# Patient Record
Sex: MALE | Race: AMERICAN INDIAN OR ALASKA NATIVE
[De-identification: names, ages, dates, MRNs, and addresses within clinical notes are randomized per-mention and may not be internally consistent; named-entity substitution may affect disease eponyms.]

---

## 2018-12-29 ENCOUNTER — HOSPITAL ENCOUNTER (INPATIENT)
Dept: HOSPITAL 5 - ED | Age: 58
LOS: 2 days | Discharge: HOME | DRG: 291 | End: 2018-12-31
Attending: INTERNAL MEDICINE | Admitting: INTERNAL MEDICINE
Payer: MEDICARE

## 2018-12-29 DIAGNOSIS — N18.3: ICD-10-CM

## 2018-12-29 DIAGNOSIS — Z87.891: ICD-10-CM

## 2018-12-29 DIAGNOSIS — I50.43: ICD-10-CM

## 2018-12-29 DIAGNOSIS — I13.0: Primary | ICD-10-CM

## 2018-12-29 LAB
ALBUMIN SERPL-MCNC: 3.8 G/DL (ref 3.9–5)
ALT SERPL-CCNC: 36 UNITS/L (ref 7–56)
BACTERIA #/AREA URNS HPF: (no result) /HPF
BASOPHILS # (AUTO): 0.1 K/MM3 (ref 0–0.1)
BASOPHILS NFR BLD AUTO: 1.2 % (ref 0–1.8)
BILIRUB UR QL STRIP: (no result)
BLOOD UR QL VISUAL: (no result)
BUN SERPL-MCNC: 23 MG/DL (ref 9–20)
BUN/CREAT SERPL: 10 %
CALCIUM SERPL-MCNC: 9 MG/DL (ref 8.4–10.2)
EOSINOPHIL # BLD AUTO: 0.1 K/MM3 (ref 0–0.4)
EOSINOPHIL NFR BLD AUTO: 1.5 % (ref 0–4.3)
HCT VFR BLD CALC: 42.6 % (ref 35.5–45.6)
HDLC SERPL-MCNC: 45 MG/DL (ref 40–59)
HEMOLYSIS INDEX: 1
HGB BLD-MCNC: 13.8 GM/DL (ref 11.8–15.2)
LYMPHOCYTES # BLD AUTO: 1.9 K/MM3 (ref 1.2–5.4)
LYMPHOCYTES NFR BLD AUTO: 22 % (ref 13.4–35)
MCH RBC QN AUTO: 26 PG (ref 28–32)
MCHC RBC AUTO-ENTMCNC: 32 % (ref 32–34)
MCV RBC AUTO: 82 FL (ref 84–94)
MONOCYTES # (AUTO): 0.6 K/MM3 (ref 0–0.8)
MONOCYTES % (AUTO): 6.6 % (ref 0–7.3)
PH UR STRIP: 7 [PH] (ref 5–7)
PLATELET # BLD: 295 K/MM3 (ref 140–440)
RBC # BLD AUTO: 5.21 M/MM3 (ref 3.65–5.03)
RBC #/AREA URNS HPF: < 1 /HPF (ref 0–6)
UROBILINOGEN UR-MCNC: < 2 MG/DL (ref ?–2)
WBC #/AREA URNS HPF: 1 /HPF (ref 0–6)

## 2018-12-29 PROCEDURE — 36415 COLL VENOUS BLD VENIPUNCTURE: CPT

## 2018-12-29 PROCEDURE — 80053 COMPREHEN METABOLIC PANEL: CPT

## 2018-12-29 PROCEDURE — 84484 ASSAY OF TROPONIN QUANT: CPT

## 2018-12-29 PROCEDURE — 83880 ASSAY OF NATRIURETIC PEPTIDE: CPT

## 2018-12-29 PROCEDURE — 93010 ELECTROCARDIOGRAM REPORT: CPT

## 2018-12-29 PROCEDURE — 85025 COMPLETE CBC W/AUTO DIFF WBC: CPT

## 2018-12-29 PROCEDURE — 96374 THER/PROPH/DIAG INJ IV PUSH: CPT

## 2018-12-29 PROCEDURE — 71045 X-RAY EXAM CHEST 1 VIEW: CPT

## 2018-12-29 PROCEDURE — 93306 TTE W/DOPPLER COMPLETE: CPT

## 2018-12-29 PROCEDURE — 83036 HEMOGLOBIN GLYCOSYLATED A1C: CPT

## 2018-12-29 PROCEDURE — 81001 URINALYSIS AUTO W/SCOPE: CPT

## 2018-12-29 PROCEDURE — 93017 CV STRESS TEST TRACING ONLY: CPT

## 2018-12-29 PROCEDURE — 93005 ELECTROCARDIOGRAM TRACING: CPT

## 2018-12-29 PROCEDURE — 78452 HT MUSCLE IMAGE SPECT MULT: CPT

## 2018-12-29 PROCEDURE — A9502 TC99M TETROFOSMIN: HCPCS

## 2018-12-29 PROCEDURE — 80061 LIPID PANEL: CPT

## 2018-12-29 PROCEDURE — 99406 BEHAV CHNG SMOKING 3-10 MIN: CPT

## 2018-12-29 RX ADMIN — HYDROMORPHONE HYDROCHLORIDE PRN MG: 1 INJECTION, SOLUTION INTRAMUSCULAR; INTRAVENOUS; SUBCUTANEOUS at 22:34

## 2018-12-29 RX ADMIN — HYDRALAZINE HYDROCHLORIDE PRN MG: 20 INJECTION INTRAMUSCULAR; INTRAVENOUS at 16:51

## 2018-12-29 RX ADMIN — HYDRALAZINE HYDROCHLORIDE SCH MG: 25 TABLET, FILM COATED ORAL at 13:07

## 2018-12-29 RX ADMIN — POTASSIUM CHLORIDE SCH MEQ: 1500 TABLET, EXTENDED RELEASE ORAL at 13:07

## 2018-12-29 RX ADMIN — FUROSEMIDE SCH MG: 10 INJECTION, SOLUTION INTRAVENOUS at 17:43

## 2018-12-29 RX ADMIN — FAMOTIDINE SCH MG: 10 TABLET ORAL at 22:26

## 2018-12-29 RX ADMIN — Medication SCH ML: at 22:27

## 2018-12-29 RX ADMIN — ISOSORBIDE DINITRATE SCH MG: 20 TABLET ORAL at 10:20

## 2018-12-29 RX ADMIN — ENOXAPARIN SODIUM SCH MG: 100 INJECTION SUBCUTANEOUS at 17:43

## 2018-12-29 RX ADMIN — POTASSIUM CHLORIDE SCH MEQ: 1500 TABLET, EXTENDED RELEASE ORAL at 22:27

## 2018-12-29 RX ADMIN — FAMOTIDINE SCH MG: 10 TABLET ORAL at 13:07

## 2018-12-29 RX ADMIN — HYDRALAZINE HYDROCHLORIDE PRN MG: 20 INJECTION INTRAMUSCULAR; INTRAVENOUS at 22:34

## 2018-12-29 NOTE — HISTORY AND PHYSICAL REPORT
History of Present Illness


Date of examination: 12/29/18


Date of admission: 


12/29/2018


Chief complaint: 


Chief complaint


Increasing shortness of breath for 2 weeks





History of present illness: 


Apache:





58-year-old male with history of hypertension congestive heart failure comes in 

for increasing shortness of breath for the past 2 weeks.  Patient has sounds of 

breath on minimal exertion and has orthopnea.  No paroxysmal nocturnal dyspnea 

attacks.  No fever or chills.





Past Medical History


Previous Medical History?: Yes


Hx Hypertension: Yes


Hx Congestive Heart Failure: Yes


Additional medical history: BRONCHITIS





Surgical History


Past Surgical History?: No





Family History


Family history: no significant





 Social History


Smoking Status: Former Smoker (none x 6 months)


Substance Use Type: None (denies illicit drug use)





Medications


Home Medications: 


                                Home Medications











 Medication  Instructions  Recorded  Confirmed  Last Taken  Type


 


Hydralazine HCl 1 tab PO DAILY 12/29/18 12/29/18 12/28/18 History


 


Isosorbide Dinitrate 20 mg PO DAILY 12/29/18 12/29/18 12/28/18 History


 


Torsemide [Demadex] 40 mg PO DAILY 12/29/18 12/29/18 12/29/18 History











Review of Systems


ROS: 


Stated complaint: FLU


Other details as noted in HPI





Constitutional: no symptoms reported


Eyes: denies: eye pain


ENT: denies: throat pain


Respiratory: shortness of breath, SOB with exertion


Cardiovascular: chest pain, dyspnea on exertion


Endocrine: no symptoms reported


Gastrointestinal: other (abdomen feels tight)


Genitourinary: denies: dysuria


Musculoskeletal: denies: back pain


Neurological: denies: headache








Medications and Allergies


                                    Allergies











Allergy/AdvReac Type Severity Reaction Status Date / Time


 


No Known Allergies Allergy   Verified 12/29/18 09:24











                                Home Medications











 Medication  Instructions  Recorded  Confirmed  Last Taken  Type


 


Hydralazine HCl 1 tab PO DAILY 12/29/18 12/29/18 12/28/18 History


 


Isosorbide Dinitrate 20 mg PO DAILY 12/29/18 12/29/18 12/28/18 History


 


Torsemide [Demadex] 40 mg PO DAILY 12/29/18 12/29/18 12/29/18 History














Exam





- Constitutional


Vitals: 


                                        











Temp Pulse Resp BP Pulse Ox


 


 98.1 F   96 H  28 H  160/110   94 


 


 12/29/18 05:47  12/29/18 08:00  12/29/18 08:00  12/29/18 08:00  12/29/18 08:00











General appearance: Present: no acute distress, well-nourished





- EENT


Eyes: Present: PERRL


ENT: hearing intact, clear oral mucosa





- Neck


Neck: Present: supple, normal ROM





- Respiratory


Respiratory effort: normal


Respiratory: bilateral: CTA, rales





- Cardiovascular


Heart rate: 86


Rhythm: regular


Heart Sounds: Present: S1 & S2.  Absent: rub, click





- Extremities


Extremities: no ischemia, pulses intact, pulses symmetrical, No edema


Peripheral Pulses: within normal limits





- Abdominal


General gastrointestinal: Present: soft, non-tender, non-distended, normal bowel

 sounds


Male genitourinary: Present: normal





- Integumentary


Integumentary: Present: clear, warm, dry





- Musculoskeletal


Musculoskeletal: gait normal, strength equal bilaterally





- Psychiatric


Psychiatric: appropriate mood/affect, intact judgment & insight





- Neurologic


Neurologic: CNII-XII intact, moves all extremities





- Allied Health


Allied health notes reviewed: nursing, case management





Results





- Labs


CBC & Chem 7: 


                                 12/30/18 04:52





                                 12/30/18 04:52


Labs: 


                             Laboratory Last Values











WBC  8.5 K/mm3 (4.5-11.0)   12/29/18  04:08    


 


RBC  5.21 M/mm3 (3.65-5.03)  H  12/29/18  04:08    


 


Hgb  13.8 gm/dl (11.8-15.2)   12/29/18  04:08    


 


Hct  42.6 % (35.5-45.6)   12/29/18  04:08    


 


MCV  82 fl (84-94)  L  12/29/18  04:08    


 


MCH  26 pg (28-32)  L  12/29/18  04:08    


 


MCHC  32 % (32-34)   12/29/18  04:08    


 


RDW  15.8 % (13.2-15.2)  H  12/29/18  04:08    


 


Plt Count  295 K/mm3 (140-440)   12/29/18  04:08    


 


Lymph % (Auto)  22.0 % (13.4-35.0)   12/29/18  04:08    


 


Mono % (Auto)  6.6 % (0.0-7.3)   12/29/18  04:08    


 


Eos % (Auto)  1.5 % (0.0-4.3)   12/29/18  04:08    


 


Baso % (Auto)  1.2 % (0.0-1.8)   12/29/18  04:08    


 


Lymph #  1.9 K/mm3 (1.2-5.4)   12/29/18  04:08    


 


Mono #  0.6 K/mm3 (0.0-0.8)   12/29/18  04:08    


 


Eos #  0.1 K/mm3 (0.0-0.4)   12/29/18  04:08    


 


Baso #  0.1 K/mm3 (0.0-0.1)   12/29/18  04:08    


 


Seg Neutrophils %  68.7 % (40.0-70.0)   12/29/18  04:08    


 


Seg Neutrophils #  5.9 K/mm3 (1.8-7.7)   12/29/18  04:08    


 


Sodium  137 mmol/L (137-145)   12/29/18  04:08    


 


Potassium  3.7 mmol/L (3.6-5.0)   12/29/18  04:08    


 


Chloride  93.2 mmol/L ()  L  12/29/18  04:08    


 


Carbon Dioxide  31 mmol/L (22-30)  H  12/29/18  04:08    


 


Anion Gap  17 mmol/L  12/29/18  04:08    


 


BUN  23 mg/dL (9-20)  H  12/29/18  04:08    


 


Creatinine  2.4 mg/dL (0.8-1.5)  H  12/29/18  04:08    


 


Estimated GFR  28 ml/min  12/29/18  04:08    


 


BUN/Creatinine Ratio  10 %  12/29/18  04:08    


 


Glucose  158 mg/dL ()  H  12/29/18  04:08    


 


Calcium  9.0 mg/dL (8.4-10.2)   12/29/18  04:08    


 


Total Bilirubin  0.70 mg/dL (0.1-1.2)   12/29/18  04:08    


 


AST  29 units/L (5-40)   12/29/18  04:08    


 


ALT  36 units/L (7-56)   12/29/18  04:08    


 


Alkaline Phosphatase  106 units/L ()   12/29/18  04:08    


 


Troponin T  0.074 ng/mL (0.00-0.029)  H  12/29/18  04:08    


 


NT-Pro-B Natriuret Pep  4600 pg/mL (0-900)  H  12/29/18  04:08    


 


Total Protein  6.5 g/dL (6.3-8.2)   12/29/18  04:08    


 


Albumin  3.8 g/dL (3.9-5)  L  12/29/18  04:08    


 


Albumin/Globulin Ratio  1.4 %  12/29/18  04:08    


 


Triglycerides  99 mg/dL (2-149)   12/29/18  04:08    


 


Cholesterol  153 mg/dL ()   12/29/18  04:08    


 


LDL Cholesterol Direct  103 mg/dL ()   12/29/18  04:08    


 


HDL Cholesterol  45 mg/dL (40-59)   12/29/18  04:08    


 


Cholesterol/HDL Ratio  3.40 %  12/29/18  04:08    


 


Urine Color  Colorless  (Yellow)   12/29/18  04:30    


 


Urine Turbidity  Clear  (Clear)   12/29/18  04:30    


 


Urine pH  7.0  (5.0-7.0)   12/29/18  04:30    


 


Ur Specific Gravity  1.003  (1.003-1.030)   12/29/18  04:30    


 


Urine Protein  100 mg/dl mg/dL (Negative)   12/29/18  04:30    


 


Urine Glucose (UA)  Neg mg/dL (Negative)   12/29/18  04:30    


 


Urine Ketones  Neg mg/dL (Negative)   12/29/18  04:30    


 


Urine Blood  Sm  (Negative)   12/29/18  04:30    


 


Urine Nitrite  Neg  (Negative)   12/29/18  04:30    


 


Urine Bilirubin  Neg  (Negative)   12/29/18  04:30    


 


Urine Urobilinogen  < 2.0 mg/dL (<2.0)   12/29/18  04:30    


 


Ur Leukocyte Esterase  Neg  (Negative)   12/29/18  04:30    


 


Urine WBC (Auto)  1.0 /HPF (0.0-6.0)   12/29/18  04:30    


 


Urine RBC (Auto)  < 1.0 /HPF (0.0-6.0)   12/29/18  04:30    


 


U Epithel Cells (Auto)  < 1.0 /HPF (0-13.0)   12/29/18  04:30    


 


Urine Bacteria (Auto)  1+ /HPF (Negative)   12/29/18  04:30    














- Imaging and Cardiology


EKG: report reviewed (sinus tachycardia 102 per minute no acute ST-T wave 

changes)


Imaging and Cardiology: 





CXR


IMPRESSION: 


Mild vascular congestion with slight bilateral effusions. Moderate 

cardiomegaly.. 








Assessment and Plan


Advance Directives: Yes (full code)


VTE prophylaxis?: Chemical


Plan of care discussed with patient/family: Yes





- Patient Problems


(1) Acute CHF (congestive heart failure)


Current Visit: Yes   Status: Acute   


Qualifiers: 


   Heart failure type: combined systolic and diastolic   Qualified Code(s): 

I50.41 - Acute combined systolic (congestive) and diastolic (congestive) heart 

failure   


Plan to address problem: 


IV Lasix initiated 


echocardiogram ordered


Daily weights


Intake and output


Cardiology consult if necessary








(2) Hypertension


Current Visit: Yes   Status: Chronic   


Qualifiers: 


   Hypertension type: essential hypertension   Qualified Code(s): I10 - 

Essential (primary) hypertension   


Plan to address problem: 


Continue antihypertensives








(3) Chronic kidney disease


Current Visit: Yes   Status: Chronic   


Qualifiers: 


   Chronic kidney disease stage: stage 3 (moderate)   Qualified Code(s): N18.3 -

 Chronic kidney disease, stage 3 (moderate)   


Plan to address problem: 


Stable








(4) DVT prophylaxis


Current Visit: Yes   Status: Acute   


Plan to address problem: 


Lovenox subcutaneous and GI prophylaxis

## 2018-12-29 NOTE — EMERGENCY DEPARTMENT REPORT
HPI





- General


Chief Complaint: Dyspnea/Respdistress


Time Seen by Provider: 18 06:04





- [facility]


HPI: 





Room 17





The patient is a 58-year-old male presenting with chief complaint of shortness 

of breath.  The patient states he has had worsening shortness of breath and 

dyspnea on exertion for the past 2 weeks.  Patient states he's had 2 pillow 

orthopnea.  The patient states she has been compliant with his diuretic.  

Patient states he's had intermittent sharps left-sided chest pain for the past 3

weeks.  Yesterday the patient states she developed bilateral lower extremity 

edema.





Location: Lungs, see above


Duration: [See above]


Quality: Shortness of breath


Severity: Moderate


Modifying factors: [see above]


Context: [see above]


Mode of transportation: [not driving]





ED Past Medical Hx





- Past Medical History


Previous Medical History?: Yes


Hx Hypertension: Yes


Hx Congestive Heart Failure: Yes


Additional medical history: BRONCHITIS





- Surgical History


Past Surgical History?: No





- Family History


Family history: no significant





- Social History


Smoking Status: Former Smoker (none x 6 months)


Substance Use Type: None (denies illicit drug use)





- Medications


Home Medications: 


                                Home Medications











 Medication  Instructions  Recorded  Confirmed  Last Taken  Type


 


Hydralazine HCl 1 tab PO DAILY 18 History


 


Isosorbide Dinitrate 20 mg PO DAILY 18 History


 


Torsemide [Demadex] 40 mg PO DAILY 18 History














ED Review of Systems


ROS: 


Stated complaint: FLU


Other details as noted in HPI





Constitutional: no symptoms reported


Eyes: denies: eye pain


ENT: denies: throat pain


Respiratory: shortness of breath, SOB with exertion


Cardiovascular: chest pain, dyspnea on exertion


Endocrine: no symptoms reported


Gastrointestinal: other (abdomen feels tight)


Genitourinary: denies: dysuria


Musculoskeletal: denies: back pain


Neurological: denies: headache





Physical Exam





- Physical Exam


Vital Signs: 


                                   Vital Signs











  18





  03:20 03:25 03:30


 


Temperature 98.4 F  


 


Pulse Rate 112 H  107 H


 


Respiratory 26 H 26 H 33 H





Rate   


 


Blood Pressure 183/130  184/128


 


Blood Pressure 183/130  





[Left]   


 


O2 Sat by Pulse 96 95 95





Oximetry   














  18





  03:46 04:00 04:15


 


Temperature   


 


Pulse Rate 110 H 106 H 100 H


 


Respiratory 35 H 25 H 17





Rate   


 


Blood Pressure 195/130 195/130 154/110


 


Blood Pressure   





[Left]   


 


O2 Sat by Pulse 93 96 98





Oximetry   














  18





  04:30 04:45 05:00


 


Temperature   


 


Pulse Rate 98 H 98 H 96 H


 


Respiratory 31 H 24 24





Rate   


 


Blood Pressure 172/116 175/119 154/110


 


Blood Pressure   





[Left]   


 


O2 Sat by Pulse 96 95 95





Oximetry   














  18





  05:16 05:30 05:46


 


Temperature   


 


Pulse Rate 98 H 97 H 99 H


 


Respiratory 24 24 22





Rate   


 


Blood Pressure 174/131 174/131 161/118


 


Blood Pressure   





[Left]   


 


O2 Sat by Pulse 93 96 88





Oximetry   














  18





  05:47 06:00


 


Temperature 98.1 F 


 


Pulse Rate  96 H


 


Respiratory 22 17





Rate  


 


Blood Pressure  174/131


 


Blood Pressure  





[Left]  


 


O2 Sat by Pulse 94 95





Oximetry  











Physical Exam: 





GENERAL: The patient is well-developed well-nourished male lying on stretcher 

not appearing to be in acute distress. []


HEENT: Normocephalic.  Atraumatic.  Extraocular motions are intact.  Patient has

 moist mucous membranes.


NECK: Supple.  Trachea midline


CHEST/LUNGS: Crackles at the right base.  There is no respiratory distress 

noted.


HEART/CARDIOVASCULAR: Regular.  There is no tachycardia.  There is no gallop rub

 or murmur.


ABDOMEN: Abdomen is soft, nontender.  Patient has normal bowel sounds.  There is

 no abdominal distention.


SKIN: There is no rash.  There is 1+ bilateral lower extremity pitting edema.  

There is no diaphoresis.


NEURO: The patient is awake, alert, and oriented.  The patient is cooperative. 

The patient has normal speech


MUSCULOSKELETAL: T There is no evidence of acute injury.





ED Course


                                   Vital Signs











  18





  03:20 03:25 03:30


 


Temperature 98.4 F  


 


Pulse Rate 112 H  107 H


 


Respiratory 26 H 26 H 33 H





Rate   


 


Blood Pressure 183/130  184/128


 


Blood Pressure 183/130  





[Left]   


 


O2 Sat by Pulse 96 95 95





Oximetry   














  18





  03:46 04:00 04:15


 


Temperature   


 


Pulse Rate 110 H 106 H 100 H


 


Respiratory 35 H 25 H 17





Rate   


 


Blood Pressure 195/130 195/130 154/110


 


Blood Pressure   





[Left]   


 


O2 Sat by Pulse 93 96 98





Oximetry   














  18





  04:30 04:45 05:00


 


Temperature   


 


Pulse Rate 98 H 98 H 96 H


 


Respiratory 31 H 24 24





Rate   


 


Blood Pressure 172/116 175/119 154/110


 


Blood Pressure   





[Left]   


 


O2 Sat by Pulse 96 95 95





Oximetry   














  18





  05:16 05:30 05:46


 


Temperature   


 


Pulse Rate 98 H 97 H 99 H


 


Respiratory 24 24 22





Rate   


 


Blood Pressure 174/131 174/131 161/118


 


Blood Pressure   





[Left]   


 


O2 Sat by Pulse 93 96 88





Oximetry   














  18





  05:47 06:00


 


Temperature 98.1 F 


 


Pulse Rate  96 H


 


Respiratory 22 17





Rate  


 


Blood Pressure  174/131


 


Blood Pressure  





[Left]  


 


O2 Sat by Pulse 94 95





Oximetry  














ED Medical Decision Making





- Lab Data


Result diagrams: 


                                 18 04:08





                                 18 04:08





                                Laboratory Tests











  18





  04:08 04:08 04:08


 


WBC  8.5  


 


RBC  5.21 H  


 


Hgb  13.8  


 


Hct  42.6  


 


MCV  82 L  


 


MCH  26 L  


 


MCHC  32  


 


RDW  15.8 H  


 


Plt Count  295  


 


Lymph % (Auto)  22.0  


 


Mono % (Auto)  6.6  


 


Eos % (Auto)  1.5  


 


Baso % (Auto)  1.2  


 


Lymph #  1.9  


 


Mono #  0.6  


 


Eos #  0.1  


 


Baso #  0.1  


 


Seg Neutrophils %  68.7  


 


Seg Neutrophils #  5.9  


 


Sodium    137


 


Potassium    3.7


 


Chloride    93.2 L


 


Carbon Dioxide    31 H


 


Anion Gap    17


 


BUN    23 H


 


Creatinine    2.4 H


 


Estimated GFR    28


 


BUN/Creatinine Ratio    10


 


Glucose    158 H


 


Calcium    9.0


 


Total Bilirubin    0.70


 


AST    29


 


ALT    36


 


Alkaline Phosphatase    106


 


Troponin T   0.074 H 


 


NT-Pro-B Natriuret Pep    4600 H


 


Total Protein    6.5


 


Albumin    3.8 L


 


Albumin/Globulin Ratio    1.4


 


Urine Color   


 


Urine Turbidity   


 


Urine pH   


 


Ur Specific Gravity   


 


Urine Protein   


 


Urine Glucose (UA)   


 


Urine Ketones   


 


Urine Blood   


 


Urine Nitrite   


 


Urine Bilirubin   


 


Urine Urobilinogen   


 


Ur Leukocyte Esterase   


 


Urine WBC (Auto)   


 


Urine RBC (Auto)   


 


U Epithel Cells (Auto)   


 


Urine Bacteria (Auto)   














  18





  04:30


 


WBC 


 


RBC 


 


Hgb 


 


Hct 


 


MCV 


 


MCH 


 


MCHC 


 


RDW 


 


Plt Count 


 


Lymph % (Auto) 


 


Mono % (Auto) 


 


Eos % (Auto) 


 


Baso % (Auto) 


 


Lymph # 


 


Mono # 


 


Eos # 


 


Baso # 


 


Seg Neutrophils % 


 


Seg Neutrophils # 


 


Sodium 


 


Potassium 


 


Chloride 


 


Carbon Dioxide 


 


Anion Gap 


 


BUN 


 


Creatinine 


 


Estimated GFR 


 


BUN/Creatinine Ratio 


 


Glucose 


 


Calcium 


 


Total Bilirubin 


 


AST 


 


ALT 


 


Alkaline Phosphatase 


 


Troponin T 


 


NT-Pro-B Natriuret Pep 


 


Total Protein 


 


Albumin 


 


Albumin/Globulin Ratio 


 


Urine Color  Colorless


 


Urine Turbidity  Clear


 


Urine pH  7.0


 


Ur Specific Gravity  1.003


 


Urine Protein  100 mg/dl


 


Urine Glucose (UA)  Neg


 


Urine Ketones  Neg


 


Urine Blood  Sm


 


Urine Nitrite  Neg


 


Urine Bilirubin  Neg


 


Urine Urobilinogen  < 2.0


 


Ur Leukocyte Esterase  Neg


 


Urine WBC (Auto)  1.0


 


Urine RBC (Auto)  < 1.0


 


U Epithel Cells (Auto)  < 1.0


 


Urine Bacteria (Auto)  1+














- EKG Data


-: EKG Interpreted by Me


EKG shows normal: sinus rhythm


Rate: normal





- EKG Data


When compared to previous EKG there are: previous EKG unavailable


Interpretation: nonspecific ST-T wave christina (T-wave inversions in leads V4, V5, 

V6)





- Radiology Data


Radiology results: report reviewed (chest x-ray), image reviewed (chest x-ray)


interpreted by me: 





Chest x-ray-CHF.  No pneumothorax





Findings


[address] [address] 

XRay Report Signed Patient: WERNER HERRERA MR#: [id number] : 1960 

Acct:[id number] Age/Sex: 58 / M ADM Date: 18 Loc: ED Attending Dr: 

Ordering Physician: ED MD JOE Date of Service: 18 Procedure(s): XR chest 

1V ap Accession Number(s): [id number] cc: ED MD JOE Fluoro Time In Minutes: FINAL 

REPORT PROCEDURE: XR CHEST 1V AP TECHNIQUE: Chest radiograph anteroposterior 

view. CPT 89045 HISTORY: Dyspnea COMPARISON: No prior studies are available for 

comparison. FINDINGS: Heart: The heart size is enlarged.. Mediastinum/Vessels: 

Mild vascular congestion. Slight bilateral effusions are suspected. No 

pneumothorax. Lungs/Pleural space: Normal. Bony thorax: No acute osseous 

abnormality. Life support devices: None. IMPRESSION: Mild vascular congestion 

with slight bilateral effusions. Moderate cardiomegaly.. Transcribed By: [facility] 

Dictated By: PRAVIN ODONNELL MD Electronically Authenticated By: PRAVIN ODONNELL MD Signed Date/Time: 18 DD/DT: 18 TD/TT: 18 





- Differential Diagnosis


CHF exacerbation, pneumonia, ACS, pericarditis


Critical care attestation.: 


If time is entered above; I have spent that time in minutes in the direct care 

of this critically ill patient, excluding procedure time.








ED Disposition


Clinical Impression: 


 CHF exacerbation, Renal insufficiency, Shortness of breath





Disposition:  OP ADMIT IP TO THIS HOSP


Is pt being admited?: Yes


Does the pt Need Aspirin: No


Condition: Fair


Time of Disposition: 06:19 (hospitalist paged)

## 2018-12-29 NOTE — XRAY REPORT
FINAL REPORT



PROCEDURE:  XR CHEST 1V AP



TECHNIQUE:  Chest radiograph anteroposterior view. CPT 92862







HISTORY:  Dyspnea 



COMPARISON:  No prior studies are available for comparison.



FINDINGS:  

Heart: The heart size is enlarged..



Mediastinum/Vessels: Mild vascular congestion. Slight bilateral effusions are suspected. No pneumotho
rax.



Lungs/Pleural space: Normal.



Bony thorax: No acute osseous abnormality.



Life support devices: None.



IMPRESSION:  

Mild vascular congestion with slight bilateral effusions. Moderate cardiomegaly..

## 2018-12-30 LAB
ALBUMIN SERPL-MCNC: 3.4 G/DL (ref 3.9–5)
ALT SERPL-CCNC: 26 UNITS/L (ref 7–56)
BASOPHILS # (AUTO): 0.1 K/MM3 (ref 0–0.1)
BASOPHILS NFR BLD AUTO: 1.1 % (ref 0–1.8)
BUN SERPL-MCNC: 26 MG/DL (ref 9–20)
BUN/CREAT SERPL: 11 %
CALCIUM SERPL-MCNC: 8.7 MG/DL (ref 8.4–10.2)
EOSINOPHIL # BLD AUTO: 0.2 K/MM3 (ref 0–0.4)
EOSINOPHIL NFR BLD AUTO: 2.6 % (ref 0–4.3)
HCT VFR BLD CALC: 42.7 % (ref 35.5–45.6)
HEMOLYSIS INDEX: 18
HGB BLD-MCNC: 13.8 GM/DL (ref 11.8–15.2)
LYMPHOCYTES # BLD AUTO: 3 K/MM3 (ref 1.2–5.4)
LYMPHOCYTES NFR BLD AUTO: 39.4 % (ref 13.4–35)
MCH RBC QN AUTO: 26 PG (ref 28–32)
MCHC RBC AUTO-ENTMCNC: 32 % (ref 32–34)
MCV RBC AUTO: 82 FL (ref 84–94)
MONOCYTES # (AUTO): 0.7 K/MM3 (ref 0–0.8)
MONOCYTES % (AUTO): 8.8 % (ref 0–7.3)
PLATELET # BLD: 306 K/MM3 (ref 140–440)
RBC # BLD AUTO: 5.23 M/MM3 (ref 3.65–5.03)

## 2018-12-30 RX ADMIN — HYDRALAZINE HYDROCHLORIDE PRN MG: 20 INJECTION INTRAMUSCULAR; INTRAVENOUS at 04:48

## 2018-12-30 RX ADMIN — Medication SCH ML: at 10:06

## 2018-12-30 RX ADMIN — POTASSIUM CHLORIDE SCH MEQ: 1500 TABLET, EXTENDED RELEASE ORAL at 21:26

## 2018-12-30 RX ADMIN — HYDRALAZINE HYDROCHLORIDE SCH MG: 25 TABLET, FILM COATED ORAL at 10:04

## 2018-12-30 RX ADMIN — DOCUSATE SODIUM SCH MG: 100 CAPSULE, LIQUID FILLED ORAL at 21:26

## 2018-12-30 RX ADMIN — ENOXAPARIN SODIUM SCH MG: 100 INJECTION SUBCUTANEOUS at 10:05

## 2018-12-30 RX ADMIN — POTASSIUM CHLORIDE SCH MEQ: 1500 TABLET, EXTENDED RELEASE ORAL at 10:05

## 2018-12-30 RX ADMIN — HYDROMORPHONE HYDROCHLORIDE PRN MG: 1 INJECTION, SOLUTION INTRAMUSCULAR; INTRAVENOUS; SUBCUTANEOUS at 16:10

## 2018-12-30 RX ADMIN — Medication SCH ML: at 21:27

## 2018-12-30 RX ADMIN — FUROSEMIDE SCH MG: 10 INJECTION, SOLUTION INTRAVENOUS at 05:06

## 2018-12-30 RX ADMIN — FAMOTIDINE SCH MG: 10 TABLET ORAL at 10:05

## 2018-12-30 RX ADMIN — FUROSEMIDE SCH MG: 10 INJECTION, SOLUTION INTRAVENOUS at 17:48

## 2018-12-30 RX ADMIN — ISOSORBIDE DINITRATE SCH MG: 20 TABLET ORAL at 10:04

## 2018-12-30 RX ADMIN — FAMOTIDINE SCH MG: 10 TABLET ORAL at 21:25

## 2018-12-31 VITALS — SYSTOLIC BLOOD PRESSURE: 141 MMHG | DIASTOLIC BLOOD PRESSURE: 93 MMHG

## 2018-12-31 RX ADMIN — HYDRALAZINE HYDROCHLORIDE PRN MG: 20 INJECTION INTRAMUSCULAR; INTRAVENOUS at 00:15

## 2018-12-31 RX ADMIN — ISOSORBIDE DINITRATE SCH MG: 20 TABLET ORAL at 13:55

## 2018-12-31 RX ADMIN — POTASSIUM CHLORIDE SCH MEQ: 1500 TABLET, EXTENDED RELEASE ORAL at 13:56

## 2018-12-31 RX ADMIN — FUROSEMIDE SCH MG: 10 INJECTION, SOLUTION INTRAVENOUS at 05:20

## 2018-12-31 RX ADMIN — HYDRALAZINE HYDROCHLORIDE PRN MG: 20 INJECTION INTRAMUSCULAR; INTRAVENOUS at 05:21

## 2018-12-31 RX ADMIN — ENOXAPARIN SODIUM SCH MG: 100 INJECTION SUBCUTANEOUS at 13:56

## 2018-12-31 RX ADMIN — FUROSEMIDE SCH MG: 10 INJECTION, SOLUTION INTRAVENOUS at 17:00

## 2018-12-31 RX ADMIN — HYDRALAZINE HYDROCHLORIDE SCH MG: 25 TABLET, FILM COATED ORAL at 13:56

## 2018-12-31 RX ADMIN — FAMOTIDINE SCH MG: 10 TABLET ORAL at 13:55

## 2018-12-31 RX ADMIN — DOCUSATE SODIUM SCH MG: 100 CAPSULE, LIQUID FILLED ORAL at 13:57

## 2018-12-31 RX ADMIN — HYDROMORPHONE HYDROCHLORIDE PRN MG: 1 INJECTION, SOLUTION INTRAMUSCULAR; INTRAVENOUS; SUBCUTANEOUS at 07:45

## 2018-12-31 RX ADMIN — Medication SCH ML: at 13:57

## 2018-12-31 NOTE — PROGRESS NOTE
Assessment and Plan





- Patient Problems


(1) Acute CHF (congestive heart failure)


Current Visit: Yes   Status: Acute   


Qualifiers: 


   Heart failure type: combined systolic and diastolic   Qualified Code(s): 

I50.41 - Acute combined systolic (congestive) and diastolic (congestive) heart 

failure   


Plan to address problem: 


IV Lasix initiated 


echocardiogram ordered


Daily weights


Intake and output


Cardiology consult if necessary








(2) Hypertension


Current Visit: Yes   Status: Chronic   


Qualifiers: 


   Hypertension type: essential hypertension   Qualified Code(s): I10 - 

Essential (primary) hypertension   


Plan to address problem: 


Continue antihypertensives








(3) Chronic kidney disease


Current Visit: Yes   Status: Chronic   


Qualifiers: 


   Chronic kidney disease stage: stage 3 (moderate)   Qualified Code(s): N18.3 -

Chronic kidney disease, stage 3 (moderate)   


Plan to address problem: 


Stable








(4) DVT prophylaxis


Current Visit: Yes   Status: Acute   


Plan to address problem: 


Lovenox subcutaneous and GI prophylaxis








Subjective


Date of service: 12/30/18


Principal diagnosis: CHF Exacerbation


Interval history: 


Sx Better








Objective





- Constitutional


Vitals: 


                               Vital Signs - 12hr











  12/31/18 12/31/18 12/31/18





  05:21 10:00 11:30


 


Temperature   


 


Pulse Rate 98 H  94 H


 


Pulse Rate [  98 H 





Apical]   


 


Pulse Rate [  98 H 





Left Radial]   


 


Pulse Rate [  98 H 





Right Radial]   


 


Respiratory  19 





Rate   


 


Blood Pressure 160/116  152/112


 


O2 Sat by Pulse   





Oximetry   














  12/31/18 12/31/18 12/31/18





  12:13 12:14 12:15


 


Temperature   


 


Pulse Rate 113 H 114 H 114 H


 


Pulse Rate [   





Apical]   


 


Pulse Rate [   





Left Radial]   


 


Pulse Rate [   





Right Radial]   


 


Respiratory   





Rate   


 


Blood Pressure 161/124 165/121 183/107


 


O2 Sat by Pulse   





Oximetry   














  12/31/18 12/31/18 12/31/18





  12:16 12:17 12:18


 


Temperature   


 


Pulse Rate 111 H 109 H 108 H


 


Pulse Rate [   





Apical]   


 


Pulse Rate [   





Left Radial]   


 


Pulse Rate [   





Right Radial]   


 


Respiratory   





Rate   


 


Blood Pressure 186/116 168/114 174/114


 


O2 Sat by Pulse   





Oximetry   














  12/31/18 12/31/18 12/31/18





  13:55 13:56 16:55


 


Temperature   98.5 F


 


Pulse Rate 100 H 100 H 100 H


 


Pulse Rate [   





Apical]   


 


Pulse Rate [   





Left Radial]   


 


Pulse Rate [   





Right Radial]   


 


Respiratory   18





Rate   


 


Blood Pressure 134/92 134/92 141/93


 


O2 Sat by Pulse   98





Oximetry   











General appearance: Present: no acute distress, well-nourished





- EENT


Eyes: PERRL, EOM intact


ENT: hearing intact, clear oral mucosa


Ears: bilateral: normal





- Neck


Neck: supple, normal ROM





- Respiratory


Respiratory effort: normal


Respiratory: bilateral: CTA





- Breasts


Breasts: normal





- Cardiovascular


Heart rate: 78


Rhythm: regular


Heart Sounds: Present: S1 & S2.  Absent: gallop, rub


Extremities: no ischemia, pulses intact, No edema, normal color, Full ROM





- Gastrointestinal


General gastrointestinal: Present: soft, non-tender, non-distended, normal bowel

sounds





- Genitourinary


Male genitourinary: normal





- Integumentary


Integumentary: clear, warm, dry





- Musculoskeletal


Musculoskeletal: 1, strength equal bilaterally





- Neurologic


Neurologic: moves all extremities





- Psychiatric


Psychiatric: memory intact, appropriate mood/affect, intact judgment & insight





- Labs


CBC & Chem 7: 


                                 12/30/18 04:52





                                 12/30/18 04:52


Labs: 


                              Abnormal lab results











  12/30/18 12/31/18 12/31/18 Range/Units





  18:53 00:39 05:33 


 


Troponin T  0.063 H  0.071 H  0.075 H  (0.00-0.029)  ng/mL

## 2018-12-31 NOTE — DISCHARGE SUMMARY
Providers





- Providers


Date of Admission: 


12/29/18 09:23





Date of discharge: 12/31/18


Attending physician: 


RUDY NEWBERRY





Primary care physician: 


PRIMARY CARE MD








Hospitalization


Condition: Fair


Pertinent studies: 


ECHO --EF 20 to 25 percent





Hospital course: 





- Patient Problems


(1) Acute CHF (congestive heart failure)


EF 20 to 25 percent


Meds optimized


ARB Betablockers and Diuretics








(2) Hypertension


Current Visit: Yes   Status: Chronic   


Qualifiers: 


   Hypertension type: essential hypertension   Qualified Code(s): I10 - 

Essential (primary) hypertension   


Plan to address problem: 


Continue antihypertensives








(3) Chronic kidney disease


Current Visit: Yes   Status: Chronic   


Qualifiers: 


   Chronic kidney disease stage: stage 3 (moderate)   Qualified Code(s): N18.3 -

Chronic kidney disease, stage 3 (moderate)   


Plan to address problem: 


Stable





Disposition: DC-01 TO HOME OR SELFCARE





- Discharge Diagnoses


(1) Acute CHF (congestive heart failure)


Status: Acute   


Qualifiers: 


   Heart failure type: combined systolic and diastolic   Qualified Code(s): 

I50.41 - Acute combined systolic (congestive) and diastolic (congestive) heart 

failure   





(2) Hypertension


Status: Chronic   


Qualifiers: 


   Hypertension type: essential hypertension   Qualified Code(s): I10 - 

Essential (primary) hypertension   





(3) Chronic kidney disease


Status: Chronic   


Qualifiers: 


   Chronic kidney disease stage: stage 3 (moderate)   Qualified Code(s): N18.3 -

Chronic kidney disease, stage 3 (moderate)   





(4) DVT prophylaxis


Status: Acute   





Core Measure Documentation





- Palliative Care


Palliative Care/ Comfort Measures: Not Applicable





- Core Measures


Any of the following diagnoses?: none





Exam





- Constitutional


Vitals: 


                                        











Temp Pulse Resp BP Pulse Ox


 


 98.5 F   100 H  18   141/93   98 


 


 12/31/18 16:55  12/31/18 16:55  12/31/18 16:55  12/31/18 16:55  12/31/18 16:55











General appearance: Present: no acute distress, well-nourished





- EENT


Eyes: Present: PERRL


ENT: hearing intact, clear oral mucosa





- Neck


Neck: Present: supple, normal ROM





- Respiratory


Respiratory effort: normal


Respiratory: bilateral: CTA





- Cardiovascular


Heart rate: 78


Rhythm: regular


Heart Sounds: Present: S1 & S2.  Absent: rub, click





- Extremities


Extremities: no ischemia, pulses intact, pulses symmetrical, No edema


Peripheral Pulses: within normal limits





- Abdominal


General gastrointestinal: Present: soft, non-tender, non-distended, normal bowel

sounds


Male genitourinary: Present: normal





- Rectal


Rectal Exam: deferred





- Integumentary


Integumentary: Present: clear, warm, dry





- Musculoskeletal


Musculoskeletal: gait normal, strength equal bilaterally





- Psychiatric


Psychiatric: appropriate mood/affect, intact judgment & insight





- Neurologic


Neurologic: CNII-XII intact, moves all extremities





- Allied Health


Allied health notes reviewed: nursing, case management





Plan


Activity: no restrictions


Diet: low fat, low cholesterol, low salt


Follow up with: 


PRIMARY CARE,MD [Primary Care Provider] - 3-5 Days


VAL KELLEY MD [Staff Physician] - 7 Days

## 2019-01-01 NOTE — TREADMILL REPORT
THALLIUM STRESS TEST



LEFT VENTRICLE:  Left ventricle is severely dilated.  There is a moderate sized,

fixed inferior defect, on the resting study, there is minimal to no significant

reversibility.



Gated analysis demonstrates severe left ventricular systolic dysfunction,

ejection fraction 15%.



CONCLUSION:  Evidence of a severe dilated cardiomyopathy with severe left

ventricular systolic dysfunction, ejection fraction 15%.  A fixed inferior wall

defect may demonstrate a prior inferior wall myocardial infarction.  There is

minimal to no significant demonstrable ischemia.  Clinical correlation is

recommended.





DD: 01/01/2019 16:18

DT: 01/01/2019 18:23

[facility]# [id number]  [id number]

[facility]

## 2022-04-30 ENCOUNTER — TELEPHONE ENCOUNTER (OUTPATIENT)
Dept: URBAN - METROPOLITAN AREA CLINIC 121 | Facility: CLINIC | Age: 62
End: 2022-04-30

## 2022-05-01 ENCOUNTER — TELEPHONE ENCOUNTER (OUTPATIENT)
Dept: URBAN - METROPOLITAN AREA CLINIC 121 | Facility: CLINIC | Age: 62
End: 2022-05-01